# Patient Record
(demographics unavailable — no encounter records)

---

## 2025-02-04 NOTE — DISCUSSION/SUMMARY
[de-identified] : A discussion regarding available pain management treatment options occurred with the patient. These included interventional, rehabilitative, pharmacological, and alternative modalities. We will proceed with the following:   Interventional treatment options: - None indicated at this time.   Imagin. MRI lumbar spine w/o contrast to evaluate for anatomic changes of the lumbar discs, nerves, and surrounding tissue that will help provide information to accurately diagnose the patient's cause of pain and therefore treat said pain generator in the most effective way possible - whether that be specific physical therapy recommendations, medications, and/or interventional therapies.   Medications: 1. Ordered Gabapentin 300 mg 2 tablets QHS (30-day supply, 60 tablets) 2. Ordered Cymbalta 30 mg qhs (30-day supply, 30 tablets)  Potential side effects were discussed which included dizziness, sedation, and pedal edema to name a few. If the patient cannot tolerate these side effects should they occur, then they will stop the medication. If the patient experiences sedation, they understand that they should not drive. The usage of the medication was discussed and the patient understands that it is an anti-epileptic medication used for neuropathic pain and that the pain relief from this medication will likely be subtle, and that hopefully in combination with the other treatments we are offering we will be able to get some additive relief.   Complementary treatment options: - lifestyle modifications discussed - avoid bending and bend with knees - avoid heavy lifting   - Follow up in 2 weeks to discuss imaging.   I Cassia Spencer attest that this documentation has been prepared under the direction and in the presence of provider Dr. Tom Heart.  The documentation recorded by the scribe in my presence, accurately reflects the service I personally performed, and the decisions made by me with my edits as appropriate.   Tom Heart, DO

## 2025-02-04 NOTE — PHYSICAL EXAM
[de-identified] : C spine  No rashes, erythema, edema noted TTP b/l cervical paraspinal and trapezius muscles Positive spurlings bilaterally RUE: 5/5 strength LUE: 5/5 strength   L spine   No rashes, erythema, edema noted TTP b/l lumbar paraspinals and sciatic notch Positive straight leg raise bilaterally Lumbar facet loading positive LLE: 5/5 strength RLE: 5/5 strength Sensation intact b/l LE

## 2025-02-04 NOTE — PHYSICAL EXAM
[de-identified] : C spine  No rashes, erythema, edema noted TTP b/l cervical paraspinal and trapezius muscles Positive spurlings bilaterally RUE: 5/5 strength LUE: 5/5 strength   L spine   No rashes, erythema, edema noted TTP b/l lumbar paraspinals and sciatic notch Positive straight leg raise bilaterally Lumbar facet loading positive LLE: 5/5 strength RLE: 5/5 strength Sensation intact b/l LE

## 2025-02-04 NOTE — DISCUSSION/SUMMARY
[de-identified] : A discussion regarding available pain management treatment options occurred with the patient. These included interventional, rehabilitative, pharmacological, and alternative modalities. We will proceed with the following:   Interventional treatment options: - None indicated at this time.   Imagin. MRI lumbar spine w/o contrast to evaluate for anatomic changes of the lumbar discs, nerves, and surrounding tissue that will help provide information to accurately diagnose the patient's cause of pain and therefore treat said pain generator in the most effective way possible - whether that be specific physical therapy recommendations, medications, and/or interventional therapies.   Medications: 1. Ordered Gabapentin 300 mg 2 tablets QHS (30-day supply, 60 tablets) 2. Ordered Cymbalta 30 mg qhs (30-day supply, 30 tablets)  Potential side effects were discussed which included dizziness, sedation, and pedal edema to name a few. If the patient cannot tolerate these side effects should they occur, then they will stop the medication. If the patient experiences sedation, they understand that they should not drive. The usage of the medication was discussed and the patient understands that it is an anti-epileptic medication used for neuropathic pain and that the pain relief from this medication will likely be subtle, and that hopefully in combination with the other treatments we are offering we will be able to get some additive relief.   Complementary treatment options: - lifestyle modifications discussed - avoid bending and bend with knees - avoid heavy lifting   - Follow up in 2 weeks to discuss imaging.   I Cassia Spencer attest that this documentation has been prepared under the direction and in the presence of provider Dr. Tom Heart.  The documentation recorded by the scribe in my presence, accurately reflects the service I personally performed, and the decisions made by me with my edits as appropriate.   Tom Heart, DO

## 2025-03-05 NOTE — DISCUSSION/SUMMARY
[de-identified] : A discussion regarding available pain management treatment options occurred with the patient. These included interventional, rehabilitative, pharmacological, and alternative modalities. We will proceed with the following:   Interventional treatment options: 1. Proceed with a Left L3-4, L4-5 transforaminal epidural steroid injection under fluoroscopic guidance.  Treatment options were discussed. The patient has been having persistent, severe low back pain and lumbar radicular pain that has minimally improved with conservative therapy thus far (including physical therapy, home stretching and anti-inflammatory medications. The patient was given the option of proceeding with a lumbar epidural steroid injection to try and get the patient some pain relief. The risks and benefits were discussed, which included the associated problems with steroids, bleeding, nerve injury, lack of improvement with pain, and 0.5% chance of a post dural puncture headache.   Complementary treatment options: - lifestyle modifications discussed - avoid bending and bend with knees - avoid heavy lifting   - Follow up 2 weeks post injection.   I Cassia Spencer attest that this documentation has been prepared under the direction and in the presence of provider Dr. Tom Heart.  The documentation recorded by the scribe in my presence, accurately reflects the service I personally performed, and the decisions made by me with my edits as appropriate.   Tom Heart, DO

## 2025-03-05 NOTE — PHYSICAL EXAM
[de-identified] : C spine  No rashes, erythema, edema noted TTP b/l cervical paraspinal and trapezius muscles Positive spurlings bilaterally RUE: 5/5 strength LUE: 5/5 strength   L spine   No rashes, erythema, edema noted TTP b/l lumbar paraspinals and sciatic notch Positive straight leg raise bilaterally Lumbar facet loading positive LLE: 5/5 strength RLE: 5/5 strength Sensation intact b/l LE

## 2025-03-05 NOTE — DATA REVIEWED
[FreeTextEntry1] : MRI of the lumbar spine taken on 2- @ [facility] showed mild S scoliosis with rotary component and preservation of sagittal lordosis. Multilevel disc disease L1-2 through L5-S1. L5-S1 mild disc bulge and facet arthrosis. L4-5 degenerated bulging intervertebral disc. Interval minimal anterolisthesis of L4. Mild facet arthrosis. L3-4 shallow left foraminal and extraforaminal small disc herniation. No change. L2-3 asymmetric right sided disc bulge. No change. L1-2 severe chronic disc degeneration. Mildly bulging disc. No change.

## 2025-03-05 NOTE — DISCUSSION/SUMMARY
[de-identified] : A discussion regarding available pain management treatment options occurred with the patient. These included interventional, rehabilitative, pharmacological, and alternative modalities. We will proceed with the following:   Interventional treatment options: 1. Proceed with a Left L3-4, L4-5 transforaminal epidural steroid injection under fluoroscopic guidance.  Treatment options were discussed. The patient has been having persistent, severe low back pain and lumbar radicular pain that has minimally improved with conservative therapy thus far (including physical therapy, home stretching and anti-inflammatory medications. The patient was given the option of proceeding with a lumbar epidural steroid injection to try and get the patient some pain relief. The risks and benefits were discussed, which included the associated problems with steroids, bleeding, nerve injury, lack of improvement with pain, and 0.5% chance of a post dural puncture headache.   Complementary treatment options: - lifestyle modifications discussed - avoid bending and bend with knees - avoid heavy lifting   - Follow up 2 weeks post injection.   I Cassia Spencer attest that this documentation has been prepared under the direction and in the presence of provider Dr. Tom Heart.  The documentation recorded by the scribe in my presence, accurately reflects the service I personally performed, and the decisions made by me with my edits as appropriate.   Tom Heart, DO

## 2025-03-05 NOTE — HISTORY OF PRESENT ILLNESS
[FreeTextEntry1] : HISTORY OF PRESENT ILLNESS: This is a 57-year-old female with chronic pain involving her neck and low back with radicular features. She has undergone a bevy of interventional procedures such as cervical epidurals versus lumbar epidurals in the past, which gave her excellent sustained benefit.  PRESENTING TODAY: In revisit encounter in regard to her continued neck and lower back pain. She is most recently s/p a series of three left L2-L3, L3-L4 SNRIs w/MAC with her most recent injection on 6/29/23 with excellent relief. She states that her pain has been tolerable. She continues to respond well to periodic epidural steroid injections. She has been undergoing physical therapy treatments for her lumbar spine and aggravated her shoulder pain on one of the exercises. I recommended she visit orthopedics for evaluation. She has also been utilizing Gabapentin 300 mg QID and Cymbalta which provides her with at least 50% relief on most days. She states that her pain continues to be worse at night before bedtime and does not allow for adequate sleep.   TODAY, 5-: Revisit encounter. She is a former Dr. Ann patient presenting to [facility]. She has chronic neck and lower back pain.   She states her pain is currently manageable. She has recently been going through a lot with her daughter and do to all of the stress from dealing with her daughter's health conditions, she was recently placed on anti-anxiety medications. For her pain, she medically manages her pain with Gabapentin 300 mg 1 tablet qhs and Cymbalta 30 mg 2 tablets qhs.   2-4-2025: Revisit encounter.   She is presenting today with intermittent pain in the neck.   With regards to her lower back pain, she is presenting with pain flaring up. She has been experiencing worsening pain in the lower lumbar spine with referred pain into the left worse than right lower extremity. She describes the pain as sharp, shooting, throbbing, aching. Her pain is made worse with standing or walking for prolonged periods of time. Her pain is present daily and rated at a 7/10 on the pain scale.   For her pain, she medically manages her pain with Gabapentin 300 mg 1 tablet qhs and Cymbalta 30 mg 2 tablets qhs.   3-4-2025: Revisit encounter.   Since her last office visit, she underwent an MRI of the lumbar spine. She states her intermittent in nature and the most bothersome at night when lying down in bed. She cannot fall asleep unless she has a heating pad on to help relieve the pain. Her pain is mainly on the left side of the lumbar spine with radicular feature into the left lower extremity. Her pain is rated anywhere from a 4 to a 7/10 on the pain scale.

## 2025-03-05 NOTE — HISTORY OF PRESENT ILLNESS
[FreeTextEntry1] : HISTORY OF PRESENT ILLNESS: This is a 57-year-old female with chronic pain involving her neck and low back with radicular features. She has undergone a bevy of interventional procedures such as cervical epidurals versus lumbar epidurals in the past, which gave her excellent sustained benefit.  PRESENTING TODAY: In revisit encounter in regard to her continued neck and lower back pain. She is most recently s/p a series of three left L2-L3, L3-L4 SNRIs w/MAC with her most recent injection on 6/29/23 with excellent relief. She states that her pain has been tolerable. She continues to respond well to periodic epidural steroid injections. She has been undergoing physical therapy treatments for her lumbar spine and aggravated her shoulder pain on one of the exercises. I recommended she visit orthopedics for evaluation. She has also been utilizing Gabapentin 300 mg QID and Cymbalta which provides her with at least 50% relief on most days. She states that her pain continues to be worse at night before bedtime and does not allow for adequate sleep.   TODAY, 5-: Revisit encounter. She is a former Dr. Ann patient presenting to [facility]. She has chronic neck and lower back pain.   She states her pain is currently manageable. She has recently been going through a lot with her daughter and do to all of the stress from dealing with her daughter's health conditions, she was recently placed on anti-anxiety medications. For her pain, she medically manages her pain with Gabapentin 300 mg 1 tablet qhs and Cymbalta 30 mg 2 tablets qhs.   2-4-2025: Revisit encounter.   She is presenting today with intermittent pain in the neck.   With regards to her lower back pain, she is presenting with pain flaring up. She has been experiencing worsening pain in the lower lumbar spine with referred pain into the left worse than right lower extremity. She describes the pain as sharp, shooting, throbbing, aching. Her pain is made worse with standing or walking for prolonged periods of time. Her pain is present daily and rated at a 7/10 on the pain scale.   For her pain, she medically manages her pain with Gabapentin 300 mg 1 tablet qhs and Cymbalta 30 mg 2 tablets qhs.   3-4-2025: Revisit encounter.   Since her last office visit, she underwent an MRI of the lumbar spine. She states her intermittent in nature and the most bothersome at night when lying down in bed. She cannot fall asleep unless she has a heating pad on to help relieve the pain. Her pain is mainly on the left side of the lumbar spine with radicular feature into the left lower extremity. Her pain is rated anywhere from a 4 to a 7/10 on the pain scale.

## 2025-03-05 NOTE — PHYSICAL EXAM
[de-identified] : C spine  No rashes, erythema, edema noted TTP b/l cervical paraspinal and trapezius muscles Positive spurlings bilaterally RUE: 5/5 strength LUE: 5/5 strength   L spine   No rashes, erythema, edema noted TTP b/l lumbar paraspinals and sciatic notch Positive straight leg raise bilaterally Lumbar facet loading positive LLE: 5/5 strength RLE: 5/5 strength Sensation intact b/l LE

## 2025-03-31 NOTE — PROCEDURE
[FreeTextEntry3] : Date of Service: 03/31/2025  Account: [id number] Patient: KYLE OCHOA YOB: 1964 Age: 60 year Surgeon: Tom Heart DO Assistant: None. Pre-Operative Diagnosis:   Lumbar Radiculopathy Post Operative Diagnosis:  Lumbar Radiculopathy Procedure:  Left L3-L4, L4-L5 transforaminal epidural steroid injection under fluoroscopic guidance. Anesthesia: MAC   This procedure was carried out using fluoroscopic guidance.  The risks and benefits of the procedure were discussed extensively with the patient.  The consent of the patient was obtained and the following procedure was performed. The patient was placed in the prone position on the fluoroscopy table and the area was prepped and draped in a sterile fashion.  A timeout was performed with all essential staff present and the site and side were verified.   The left L3-L4 neural foramen was then identified on left oblique "zeynep dog" anatomical view at the 6 o' clock position using fluoroscopic guidance, and the area was marked.  A 25-gauge 3.5-inch spinal needle was directed toward the inferior (6 o'clock) position of the pedicle, which formed the roof of the identified foramen.  Once in the epidural space, after negative aspiration for heme and CSF, 1cc of Omnipaque contrast was injected to confirm epidural location and assess filling defects and rule out intravascular needle placement.   Lumbar epidurogram showed no intravascular or intrathecal flow pattern.  No blood or CSF was aspirated.  Omnipaque spread medially in epidural space and outlined the exiting nerve root.   After this, 2.5cc of a 5cc mixture of preservative free normal saline 10mg dexamethasone was injected in the epidural space.   The left L4-L5 neural foramen was then identified on left oblique "zeynep dog" anatomical view at the 6 o' clock position using fluoroscopic guidance, and the area was marked. A 25-gauge 3.5-inch spinal needle was directed toward the inferior (6 o'clock) position of the pedicle, which formed the roof of the identified foramen.  Once in the epidural space, after negative aspiration for heme and CSF, 1cc of Omnipaque contrast was injected to confirm epidural location and assess filling defects and rule out intravascular needle placement.   Lumbar epidurogram showed no intravascular or intrathecal flow pattern.  No blood or CSF was aspirated.  Omnipaque spread medially in epidural space and outlined the exiting nerve root.   After this, the remainder of the injectate listed above was injected in the epidural space.   The needle was subsequently removed.  Vital signs remained normal.  Pulse oximeter was used throughout the procedure and the patient's pulse and oxygen saturation remained within normal limits.  The patient tolerated the procedure well.  There were no complications.  The patient was instructed to apply ice over the injection sites for twenty minutes every two hours for the next 24 to 48 hours.     Disposition:        1. The patient was advised to F/U in 1-2 weeks to assess the response to the injection.      2. The patient was also instructed to contact me immediately if there were any concerns related to the procedure performed.

## 2025-03-31 NOTE — PROCEDURE
[FreeTextEntry3] : Date of Service: 03/31/2025  Account: [id number] Patient: KYLE OCHOA YOB: 1964 Age: 60 year Surgeon: Tom Heart DO Assistant: None. Pre-Operative Diagnosis:   Lumbar Radiculopathy Post Operative Diagnosis:  Lumbar Radiculopathy Procedure:  Left L3-L4, L4-L5 transforaminal epidural steroid injection under fluoroscopic guidance. Anesthesia: MAC   This procedure was carried out using fluoroscopic guidance.  The risks and benefits of the procedure were discussed extensively with the patient.  The consent of the patient was obtained and the following procedure was performed. The patient was placed in the prone position on the fluoroscopy table and the area was prepped and draped in a sterile fashion.  A timeout was performed with all essential staff present and the site and side were verified.   The left L3-L4 neural foramen was then identified on left oblique "zeynep dog" anatomical view at the 6 o' clock position using fluoroscopic guidance, and the area was marked.  A 25-gauge 3.5-inch spinal needle was directed toward the inferior (6 o'clock) position of the pedicle, which formed the roof of the identified foramen.  Once in the epidural space, after negative aspiration for heme and CSF, 1cc of Omnipaque contrast was injected to confirm epidural location and assess filling defects and rule out intravascular needle placement.   Lumbar epidurogram showed no intravascular or intrathecal flow pattern.  No blood or CSF was aspirated.  Omnipaque spread medially in epidural space and outlined the exiting nerve root.   After this, 2.5cc of a 5cc mixture of preservative free normal saline 10mg dexamethasone was injected in the epidural space.   The left L4-L5 neural foramen was then identified on left oblique "zeynep dog" anatomical view at the 6 o' clock position using fluoroscopic guidance, and the area was marked. A 25-gauge 3.5-inch spinal needle was directed toward the inferior (6 o'clock) position of the pedicle, which formed the roof of the identified foramen.  Once in the epidural space, after negative aspiration for heme and CSF, 1cc of Omnipaque contrast was injected to confirm epidural location and assess filling defects and rule out intravascular needle placement.   Lumbar epidurogram showed no intravascular or intrathecal flow pattern.  No blood or CSF was aspirated.  Omnipaque spread medially in epidural space and outlined the exiting nerve root.   After this, the remainder of the injectate listed above was injected in the epidural space.   The needle was subsequently removed.  Vital signs remained normal.  Pulse oximeter was used throughout the procedure and the patient's pulse and oxygen saturation remained within normal limits.  The patient tolerated the procedure well.  There were no complications.  The patient was instructed to apply ice over the injection sites for twenty minutes every two hours for the next 24 to 48 hours.     Disposition:        1. The patient was advised to F/U in 1-2 weeks to assess the response to the injection.      2. The patient was also instructed to contact me immediately if there were any concerns related to the procedure performed.

## 2025-04-23 NOTE — PHYSICAL EXAM
[de-identified] : C spine  No rashes, erythema, edema noted TTP b/l cervical paraspinal and trapezius muscles Positive spurlings bilaterally RUE: 5/5 strength LUE: 5/5 strength   L spine   No rashes, erythema, edema noted TTP b/l lumbar paraspinals and sciatic notch Positive straight leg raise bilaterally L>R Lumbar facet loading positive LLE: 5/5 strength RLE: 5/5 strength Sensation intact b/l LE

## 2025-04-23 NOTE — PHYSICAL EXAM
[de-identified] : C spine  No rashes, erythema, edema noted TTP b/l cervical paraspinal and trapezius muscles Positive spurlings bilaterally RUE: 5/5 strength LUE: 5/5 strength   L spine   No rashes, erythema, edema noted TTP b/l lumbar paraspinals and sciatic notch Positive straight leg raise bilaterally L>R Lumbar facet loading positive LLE: 5/5 strength RLE: 5/5 strength Sensation intact b/l LE

## 2025-04-23 NOTE — HISTORY OF PRESENT ILLNESS
[FreeTextEntry1] : HISTORY OF PRESENT ILLNESS: This is a 57-year-old female with chronic pain involving her neck and low back with radicular features. She has undergone a bevy of interventional procedures such as cervical epidurals versus lumbar epidurals in the past, which gave her excellent sustained benefit.  PRESENTING TODAY: In revisit encounter in regard to her continued neck and lower back pain. She is most recently s/p a series of three left L2-L3, L3-L4 SNRIs w/MAC with her most recent injection on 6/29/23 with excellent relief. She states that her pain has been tolerable. She continues to respond well to periodic epidural steroid injections. She has been undergoing physical therapy treatments for her lumbar spine and aggravated her shoulder pain on one of the exercises. I recommended she visit orthopedics for evaluation. She has also been utilizing Gabapentin 300 mg QID and Cymbalta which provides her with at least 50% relief on most days. She states that her pain continues to be worse at night before bedtime and does not allow for adequate sleep.   TODAY, 5-: Revisit encounter. She is a former Dr. Ann patient presenting to [facility]. She has chronic neck and lower back pain.   She states her pain is currently manageable. She has recently been going through a lot with her daughter and do to all of the stress from dealing with her daughter's health conditions, she was recently placed on anti-anxiety medications. For her pain, she medically manages her pain with Gabapentin 300 mg 1 tablet qhs and Cymbalta 30 mg 2 tablets qhs.   2-4-2025: Revisit encounter.   She is presenting today with intermittent pain in the neck.   With regards to her lower back pain, she is presenting with pain flaring up. She has been experiencing worsening pain in the lower lumbar spine with referred pain into the left worse than right lower extremity. She describes the pain as sharp, shooting, throbbing, aching. Her pain is made worse with standing or walking for prolonged periods of time. Her pain is present daily and rated at a 7/10 on the pain scale.   For her pain, she medically manages her pain with Gabapentin 300 mg 1 tablet qhs and Cymbalta 30 mg 2 tablets qhs.   3-4-2025: Revisit encounter.   Since her last office visit, she underwent an MRI of the lumbar spine. She states her intermittent in nature and the most bothersome at night when lying down in bed. She cannot fall asleep unless she has a heating pad on to help relieve the pain. Her pain is mainly on the left side of the lumbar spine with radicular feature into the left lower extremity. Her pain is rated anywhere from a 4 to a 7/10 on the pain scale.   4-: Revisit encounter.   Since her last office visit, she underwent a Left L3-4, L4-5 transforaminal epidural steroid injection on 3-. She afforded about 80% relief for 2 weeks and then her pain slowly started to return. She does note some ongoing relief however there is still pain present. She continues with radicular feature into the left worse than right lower extremity. She describes the pain as sharp, shooting. Her pain is the most bothersome when lying down in bed at night. She rates the pain at a 5/10 on the pain scale.

## 2025-04-23 NOTE — DISCUSSION/SUMMARY
[de-identified] : A discussion regarding available pain management treatment options occurred with the patient. These included interventional, rehabilitative, pharmacological, and alternative modalities. We will proceed with the following:   Interventional treatment options: 1. Proceed with a Left L2-3, L3-4 transforaminal epidural steroid injection under fluoroscopic guidance.  Treatment options were discussed. The patient has been having persistent, severe low back pain and lumbar radicular pain that has minimally improved with conservative therapy thus far (including physical therapy, home stretching and anti-inflammatory medications. The patient was given the option of proceeding with a lumbar epidural steroid injection to try and get the patient some pain relief. The risks and benefits were discussed, which included the associated problems with steroids, bleeding, nerve injury, lack of improvement with pain, and 0.5% chance of a post dural puncture headache.   Complementary treatment options: - lifestyle modifications discussed - avoid bending and bend with knees - avoid heavy lifting   - Follow up 2 weeks post injection.   I Cassia Spencer attest that this documentation has been prepared under the direction and in the presence of provider Dr. Tom Heart.  The documentation recorded by the scribe in my presence, accurately reflects the service I personally performed, and the decisions made by me with my edits as appropriate.   Tom Heart, DO

## 2025-04-23 NOTE — DISCUSSION/SUMMARY
[de-identified] : A discussion regarding available pain management treatment options occurred with the patient. These included interventional, rehabilitative, pharmacological, and alternative modalities. We will proceed with the following:   Interventional treatment options: 1. Proceed with a Left L2-3, L3-4 transforaminal epidural steroid injection under fluoroscopic guidance.  Treatment options were discussed. The patient has been having persistent, severe low back pain and lumbar radicular pain that has minimally improved with conservative therapy thus far (including physical therapy, home stretching and anti-inflammatory medications. The patient was given the option of proceeding with a lumbar epidural steroid injection to try and get the patient some pain relief. The risks and benefits were discussed, which included the associated problems with steroids, bleeding, nerve injury, lack of improvement with pain, and 0.5% chance of a post dural puncture headache.   Complementary treatment options: - lifestyle modifications discussed - avoid bending and bend with knees - avoid heavy lifting   - Follow up 2 weeks post injection.   I Cassia Spencer attest that this documentation has been prepared under the direction and in the presence of provider Dr. Tom Heart.  The documentation recorded by the scribe in my presence, accurately reflects the service I personally performed, and the decisions made by me with my edits as appropriate.   Tom Heart, DO

## 2025-04-23 NOTE — HISTORY OF PRESENT ILLNESS
[FreeTextEntry1] : HISTORY OF PRESENT ILLNESS: This is a 57-year-old female with chronic pain involving her neck and low back with radicular features. She has undergone a bevy of interventional procedures such as cervical epidurals versus lumbar epidurals in the past, which gave her excellent sustained benefit.  PRESENTING TODAY: In revisit encounter in regard to her continued neck and lower back pain. She is most recently s/p a series of three left L2-L3, L3-L4 SNRIs w/MAC with her most recent injection on 6/29/23 with excellent relief. She states that her pain has been tolerable. She continues to respond well to periodic epidural steroid injections. She has been undergoing physical therapy treatments for her lumbar spine and aggravated her shoulder pain on one of the exercises. I recommended she visit orthopedics for evaluation. She has also been utilizing Gabapentin 300 mg QID and Cymbalta which provides her with at least 50% relief on most days. She states that her pain continues to be worse at night before bedtime and does not allow for adequate sleep.   TODAY, 5-: Revisit encounter. She is a former Dr. Ann patient presenting to [facility]. She has chronic neck and lower back pain.   She states her pain is currently manageable. She has recently been going through a lot with her daughter and do to all of the stress from dealing with her daughter's health conditions, she was recently placed on anti-anxiety medications. For her pain, she medically manages her pain with Gabapentin 300 mg 1 tablet qhs and Cymbalta 30 mg 2 tablets qhs.   2-4-2025: Revisit encounter.   She is presenting today with intermittent pain in the neck.   With regards to her lower back pain, she is presenting with pain flaring up. She has been experiencing worsening pain in the lower lumbar spine with referred pain into the left worse than right lower extremity. She describes the pain as sharp, shooting, throbbing, aching. Her pain is made worse with standing or walking for prolonged periods of time. Her pain is present daily and rated at a 7/10 on the pain scale.   For her pain, she medically manages her pain with Gabapentin 300 mg 1 tablet qhs and Cymbalta 30 mg 2 tablets qhs.   3-4-2025: Revisit encounter.   Since her last office visit, she underwent an MRI of the lumbar spine. She states her intermittent in nature and the most bothersome at night when lying down in bed. She cannot fall asleep unless she has a heating pad on to help relieve the pain. Her pain is mainly on the left side of the lumbar spine with radicular feature into the left lower extremity. Her pain is rated anywhere from a 4 to a 7/10 on the pain scale.   4-: Revisit encounter.   Since her last office visit, she underwent a Left L3-4, L4-5 transforaminal epidural steroid injection on 3-. She afforded about 80% relief for 2 weeks and then her pain slowly started to return. She does note some ongoing relief however there is still pain present. She continues with radicular feature into the left worse than right lower extremity. She describes the pain as sharp, shooting. Her pain is the most bothersome when lying down in bed at night. She rates the pain at a 5/10 on the pain scale.

## 2025-05-04 NOTE — PROCEDURE
[FreeTextEntry3] : Date of Service: 05/01/2025  Account: [id number] Patient: KYLE OCHOA YOB: 1964 Age: 61 year Surgeon: Tom Heart DO Assistant: None. Pre-Operative Diagnosis:   Lumbar Radiculopathy Post Operative Diagnosis:  Lumbar Radiculopathy Procedure:  Left L2-L3, L3-L4 transforaminal epidural steroid injection under fluoroscopic guidance. Anesthesia: none   This procedure was carried out using fluoroscopic guidance.  The risks and benefits of the procedure were discussed extensively with the patient.  The consent of the patient was obtained and the following procedure was performed. The patient was placed in the prone position on the fluoroscopy table and the area was prepped and draped in a sterile fashion.  A timeout was performed with all essential staff present and the site and side were verified.   The left L2-l3 neural foramen was then identified on left oblique "zeynep dog" anatomical view at the 6 o' clock position using fluoroscopic guidance, and the area was marked.  A 25-gauge 3.5-inch spinal needle was directed toward the inferior (6 o'clock) position of the pedicle, which formed the roof of the identified foramen.  Once in the epidural space, after negative aspiration for heme and CSF, 1cc of Omnipaque contrast was injected to confirm epidural location and assess filling defects and rule out intravascular needle placement.   Lumbar epidurogram showed no intravascular or intrathecal flow pattern.  No blood or CSF was aspirated.  Omnipaque spread medially in epidural space and outlined the exiting nerve root.   After this, 2.5cc of a 5cc mixture of preservative free normal saline 10mg dexamethasone was injected in the epidural space.   The left L3-L4 neural foramen was then identified on left oblique "zeynep dog" anatomical view at the 6 o' clock position using fluoroscopic guidance, and the area was marked. A 25-gauge 3.5-inch spinal needle was directed toward the inferior (6 o'clock) position of the pedicle, which formed the roof of the identified foramen.  Once in the epidural space, after negative aspiration for heme and CSF, 1cc of Omnipaque contrast was injected to confirm epidural location and assess filling defects and rule out intravascular needle placement.   Lumbar epidurogram showed no intravascular or intrathecal flow pattern.  No blood or CSF was aspirated.  Omnipaque spread medially in epidural space and outlined the exiting nerve root.   After this, the remainder of the injectate listed above was injected in the epidural space.   The needle was subsequently removed.  Vital signs remained normal.  Pulse oximeter was used throughout the procedure and the patient's pulse and oxygen saturation remained within normal limits.  The patient tolerated the procedure well.  There were no complications.  The patient was instructed to apply ice over the injection sites for twenty minutes every two hours for the next 24 to 48 hours.     Disposition:        1. The patient was advised to F/U in 1-2 weeks to assess the response to the injection.      2. The patient was also instructed to contact me immediately if there were any concerns related to the procedure performed.

## 2025-05-14 NOTE — PHYSICAL EXAM
[de-identified] : C spine  No rashes, erythema, edema noted TTP b/l cervical paraspinal and trapezius muscles Positive spurlings bilaterally RUE: 5/5 strength LUE: 5/5 strength    L spine   No rashes, erythema, edema noted TTP b/l lumbar paraspinals and sciatic notch Positive straight leg raise bilaterally L>R Lumbar facet loading positive LLE: 5/5 strength RLE: 5/5 strength Sensation intact b/l LE

## 2025-05-14 NOTE — DATA REVIEWED
Patient: Sarah Santos  YOB: 1973  Date of Encounter: 2023      Chief Complaint:   Chief Complaint   Patient presents with   • Right Hand - Post-op     Procedure(s):2023  CARPAL TUNNEL RELEASE RIGHT     Surgeon(s):  Chinedu Muniz MD            HPI:  Sarah Santos, 49 y.o. female returns in postoperative follow-up carpal tunnel release right.  She is 13 days postop and doing well.  Her hand pain and numbness have resolved.      Medical History:  Patient Active Problem List   Diagnosis   • Right hand pain   • Arthritis of carpometacarpal (CMC) joint of right thumb     Past Medical History:   Diagnosis Date   • Arthritis    • Back pain    • Carpal tunnel syndrome    • Closed fracture of condyle of right tibia    • Depression    • Elevated cholesterol    • GERD (gastroesophageal reflux disease)    • History of staph infection     after back surgery   • Hx of migraines    • Hypertension          Surgical History:  Past Surgical History:   Procedure Laterality Date   • ABDOMINAL SURGERY     • BACK SURGERY      lumbar x 2   • CARPAL TUNNEL RELEASE Right 2023    Procedure: CARPAL TUNNEL RELEASE RIGHT;  Surgeon: Chinedu Muniz MD;  Location: [facility];  Service: Orthopedics;  Laterality: Right;   •  SECTION      times 3   • COLONOSCOPY     • CYST REMOVAL Right     wrist   • HYSTERECTOMY     • KNEE ARTHROSCOPY Right        Examination:  Examination hand reveals longitudinal incision palmar aspect without surrounding erythema.  Sensation is intact.        Assessment & Plan:  49 y.o. female presents follow-up carpal tunnel release right hand 13 days postop doing well today her sutures were removed she will resume regular activity.  She does have carpal tunnel syndrome on the left it is tolerable she will return as needed.       Diagnosis Plan   1. Postop check                Cc:  Gracy Chong APRN              This document has been electronically signed by Chinedu  [FreeTextEntry1] : MRI of the lumbar spine taken on 2- @ [facility] showed mild S scoliosis with rotary component and preservation of sagittal lordosis. Multilevel disc disease L1-2 through L5-S1. L5-S1 mild disc bulge and facet arthrosis. L4-5 degenerated bulging intervertebral disc. Interval minimal anterolisthesis of L4. Mild facet arthrosis. L3-4 shallow left foraminal and extraforaminal small disc herniation. No change. L2-3 asymmetric right sided disc bulge. No change. L1-2 severe chronic disc degeneration. Mildly bulging disc. No change.  RUSH Muniz MD   April 20, 2023 10:18 EDT

## 2025-05-14 NOTE — DISCUSSION/SUMMARY
[de-identified] : A discussion regarding available pain management treatment options occurred with the patient. These included interventional, rehabilitative, pharmacological, and alternative modalities. We will proceed with the following:   Interventional treatment options: 1. Proceed with a Bilateral L3, L4, L5 medial branch facet block under fluoroscopic guidance. (VALIUM TO BE USED) Treatment options were discussed with the patient. The patient has been having persistent axial low back pain that has minimally improved with conservative therapy.    The patient was given the option to proceed with a lumbar medial branch block, which would be potentially both diagnostic and therapeutic. If the patient has a positive response to the local anesthetic, With two positive responses we can proceed with a radiofrequency ablation of the lumbar medial branch nerves for potential long term pain relief. If the patient does not get relief we can consider other options.    The risks and benefits were discussed which included bleeding, infection, nerve injury, no pain relief or worse, increased pain, intrathecal injection, and the side effects of steroids.  All questions were answered to the patient's satisfaction.  Medications: 1. Ordered DULoxetine 30 mg 2 tablets qhs (30-day supply) 2. Ordered Pregabalin 75 mg q12h prn (30-day supply) 3. Ordered Diazepam 10 mg 1 tablet to be taken 30 minutes prior to ADAM.   We are going to start Pregabalin. Potential side effects were discussed which included dizziness, sedation, and pedal edema to name a few. If the patient cannot tolerate these side effects should they occur, then they will stop the medication. If the patient experiences sedation, they understand that they should not drive. The usage of the medication was discussed and the patient understands that it is an anti-epileptic medication used for neuropathic pain and that the pain relief from this medication will likely be subtle, and that hopefully in combination with the other treatments we are offering we will be able to get some additive relief.   Complementary treatment options: - lifestyle modifications discussed - avoid bending and bend with knees - avoid heavy lifting   - Follow up in 2 weeks post injection.   I Cassia Spencer attest that this documentation has been prepared under the direction and in the presence of provider Dr. Tom Heart.  The documentation recorded by the scribe in my presence, accurately reflects the service I personally performed, and the decisions made by me with my edits as appropriate.   Tom Heart, DO

## 2025-05-14 NOTE — DISCUSSION/SUMMARY
[de-identified] : A discussion regarding available pain management treatment options occurred with the patient. These included interventional, rehabilitative, pharmacological, and alternative modalities. We will proceed with the following:   Interventional treatment options: 1. Proceed with a Bilateral L3, L4, L5 medial branch facet block under fluoroscopic guidance. (VALIUM TO BE USED) Treatment options were discussed with the patient. The patient has been having persistent axial low back pain that has minimally improved with conservative therapy.    The patient was given the option to proceed with a lumbar medial branch block, which would be potentially both diagnostic and therapeutic. If the patient has a positive response to the local anesthetic, With two positive responses we can proceed with a radiofrequency ablation of the lumbar medial branch nerves for potential long term pain relief. If the patient does not get relief we can consider other options.    The risks and benefits were discussed which included bleeding, infection, nerve injury, no pain relief or worse, increased pain, intrathecal injection, and the side effects of steroids.  All questions were answered to the patient's satisfaction.  Medications: 1. Ordered DULoxetine 30 mg 2 tablets qhs (30-day supply) 2. Ordered Pregabalin 75 mg q12h prn (30-day supply) 3. Ordered Diazepam 10 mg 1 tablet to be taken 30 minutes prior to ADAM.   We are going to start Pregabalin. Potential side effects were discussed which included dizziness, sedation, and pedal edema to name a few. If the patient cannot tolerate these side effects should they occur, then they will stop the medication. If the patient experiences sedation, they understand that they should not drive. The usage of the medication was discussed and the patient understands that it is an anti-epileptic medication used for neuropathic pain and that the pain relief from this medication will likely be subtle, and that hopefully in combination with the other treatments we are offering we will be able to get some additive relief.   Complementary treatment options: - lifestyle modifications discussed - avoid bending and bend with knees - avoid heavy lifting   - Follow up in 2 weeks post injection.   I Cassia Spencer attest that this documentation has been prepared under the direction and in the presence of provider Dr. Tom Heart.  The documentation recorded by the scribe in my presence, accurately reflects the service I personally performed, and the decisions made by me with my edits as appropriate.   Tom Heart, DO

## 2025-05-14 NOTE — PHYSICAL EXAM
[de-identified] : C spine  No rashes, erythema, edema noted TTP b/l cervical paraspinal and trapezius muscles Positive spurlings bilaterally RUE: 5/5 strength LUE: 5/5 strength    L spine   No rashes, erythema, edema noted TTP b/l lumbar paraspinals and sciatic notch Positive straight leg raise bilaterally L>R Lumbar facet loading positive LLE: 5/5 strength RLE: 5/5 strength Sensation intact b/l LE

## 2025-05-14 NOTE — HISTORY OF PRESENT ILLNESS
[FreeTextEntry1] : HISTORY OF PRESENT ILLNESS: This is a 57-year-old female with chronic pain involving her neck and low back with radicular features. She has undergone a bevy of interventional procedures such as cervical epidurals versus lumbar epidurals in the past, which gave her excellent sustained benefit.  PRESENTING TODAY: In revisit encounter in regard to her continued neck and lower back pain. She is most recently s/p a series of three left L2-L3, L3-L4 SNRIs w/MAC with her most recent injection on 6/29/23 with excellent relief. She states that her pain has been tolerable. She continues to respond well to periodic epidural steroid injections. She has been undergoing physical therapy treatments for her lumbar spine and aggravated her shoulder pain on one of the exercises. I recommended she visit orthopedics for evaluation. She has also been utilizing Gabapentin 300 mg QID and Cymbalta which provides her with at least 50% relief on most days. She states that her pain continues to be worse at night before bedtime and does not allow for adequate sleep.   TODAY, 5-: Revisit encounter. She is a former Dr. Ann patient presenting to [facility]. She has chronic neck and lower back pain.   She states her pain is currently manageable. She has recently been going through a lot with her daughter and do to all of the stress from dealing with her daughter's health conditions, she was recently placed on anti-anxiety medications. For her pain, she medically manages her pain with Gabapentin 300 mg 1 tablet qhs and Cymbalta 30 mg 2 tablets qhs.   2-4-2025: Revisit encounter.   She is presenting today with intermittent pain in the neck.   With regards to her lower back pain, she is presenting with pain flaring up. She has been experiencing worsening pain in the lower lumbar spine with referred pain into the left worse than right lower extremity. She describes the pain as sharp, shooting, throbbing, aching. Her pain is made worse with standing or walking for prolonged periods of time. Her pain is present daily and rated at a 7/10 on the pain scale.   For her pain, she medically manages her pain with Gabapentin 300 mg 1 tablet qhs and Cymbalta 30 mg 2 tablets qhs.   3-4-2025: Revisit encounter.   Since her last office visit, she underwent an MRI of the lumbar spine. She states her intermittent in nature and the most bothersome at night when lying down in bed. She cannot fall asleep unless she has a heating pad on to help relieve the pain. Her pain is mainly on the left side of the lumbar spine with radicular feature into the left lower extremity. Her pain is rated anywhere from a 4 to a 7/10 on the pain scale.   4-: Revisit encounter.   Since her last office visit, she underwent a Left L3-4, L4-5 transforaminal epidural steroid injection on 3-. She afforded about 80% relief for 2 weeks and then her pain slowly started to return. She does note some ongoing relief however there is still pain present. She continues with radicular feature into the left worse than right lower extremity. She describes the pain as sharp, shooting. Her pain is the most bothersome when lying down in bed at night. She rates the pain at a 5/10 on the pain scale.   5-: Revisit encounter.   Since her last office visit, she underwent a Left L2-3, L3-4 transforaminal epidural steroid injection on 5-1-2025. She affords about 40-50% relief from this procedure. She is now able to tolerate her symptoms a little bit better. Her pain is intermittent in nature. She rates the pain anywhere from a 4 to a 5/10 on the pain scale. She has been managing her pain with Gabapentin 300 mg q12h along with DULoxetine 30 mg 2 tablets qhs.  This medication regimen provides satisfactory relief of at least 50% and allows the patient to perform their ADLs and improve overall function. The patient uses the medication appropriately and does not demonstrate any aberrant behavior.

## 2025-05-23 NOTE — PROCEDURE
[FreeTextEntry3] : Date of Service: 05/22/2025   Account: [id number]  Patient: KYLE OCHOA   YOB: 1964  Age: 61 year  Surgeon:                  Tom Heart DO  Assistant:                None  Pre-Operative Diagnosis:   Lumbar Spondylosis      Post Operative Diagnosis:  Lumbar Spondylosis  Procedure:             Bilateral  (L3, L4, L5 medial branch) L4-5, L5-S1 facet joint block under fluoroscopic guidance.  Anesthesia:            none  This procedure was carried out using fluoroscopic guidance.  The risks and benefits of the procedure were discussed extensively with the patient.  The consent of the patient was obtained and the following procedure was performed. The patient was placed in the prone position on the fluoroscopy table and the area was prepped and draped in a sterile fashion.  A timeout was performed with all essential staff present and the site and side were verified.  The lumbar vertebral bodies were identified and the fluoroscope was obliqued ipsilateral to approximately 30 degrees to reveal the appropriate anatomical view.  The junction of the superior articulate process and transverse process at the right L4, and L5 levels were identified and marked. A spinal needle was then introduced and advanced to the above target points at the junction of the SAP and transverse processes until bone was contacted.  Fluoroscope then focused on the right sacral ala on A/P view, and marked at this point.  A spinal needle was then advanced under fluoroscopic guidance until bone was contacted at the ala.    After negative aspiration for heme and CSF, an 1 cc of a mixture of 0.5% Marcaine and 10mg decadron was injected at each of the injection sites.  The procedure was performed in the exact same fashion on the contralateral left side at the L4, L5 and sacral ala levels.  The needles were subsequently removed.  Vital signs remained normal.  Pulse oximeter was used throughout the procedure and the patient's pulse and oxygen saturation remained within normal limits.  The patient tolerated the procedure well.  There were no complications.  The patient was instructed to apply ice over the injection sites for twenty minutes every two hours for the next 24 to 48 hours.  Disposition:       1. The patient was advised to F/U in 1-2 weeks to assess the response to the injection.       2. They were advised to keep a pain diary to report the results of the diagnostic block at their FUV.      3. The patient was also instructed to contact me immediately if there were any concerns related to the procedure performed.

## 2025-06-11 NOTE — DISCUSSION/SUMMARY
[de-identified] : A discussion regarding available pain management treatment options occurred with the patient. These included interventional, rehabilitative, pharmacological, and alternative modalities. We will proceed with the following:   Interventional treatment options: 1. Proceed with a second confirmatory Bilateral L3, L4, L5 medial branch facet block under fluoroscopic guidance. (VALIUM TO BE USED) Treatment options were discussed with the patient. The patient has been having persistent axial low back pain that has minimally improved with conservative therapy.    The patient was given the option to proceed with a lumbar medial branch block, which would be potentially both diagnostic and therapeutic. If the patient has a positive response to the local anesthetic, With two positive responses we can proceed with a radiofrequency ablation of the lumbar medial branch nerves for potential long term pain relief. If the patient does not get relief we can consider other options.    The risks and benefits were discussed which included bleeding, infection, nerve injury, no pain relief or worse, increased pain, intrathecal injection, and the side effects of steroids.  All questions were answered to the patient's satisfaction.  Medications: 1. Ordered Diazepam 10 mg 1 tablet to be taken 30 minutes prior to MBB 2. Ordered Pregabalin 75 mg q12h prn (30-day supply) - Continue with Tizanidine 4 mg qhs (not due for refill)  We are going to start Pregabalin. Potential side effects were discussed which included dizziness, sedation, and pedal edema to name a few. If the patient cannot tolerate these side effects should they occur, then they will stop the medication. If the patient experiences sedation, they understand that they should not drive. The usage of the medication was discussed and the patient understands that it is an anti-epileptic medication used for neuropathic pain and that the pain relief from this medication will likely be subtle, and that hopefully in combination with the other treatments we are offering we will be able to get some additive relief.   Complementary treatment options: - lifestyle modifications discussed - avoid bending and bend with knees - avoid heavy lifting   - Follow up in 2 weeks post injection.   I Cassia Spencer attest that this documentation has been prepared under the direction and in the presence of provider Dr. Tom Heart.  The documentation recorded by the scribe in my presence, accurately reflects the service I personally performed, and the decisions made by me with my edits as appropriate.   Tom Heart, DO

## 2025-06-11 NOTE — DISCUSSION/SUMMARY
[de-identified] : A discussion regarding available pain management treatment options occurred with the patient. These included interventional, rehabilitative, pharmacological, and alternative modalities. We will proceed with the following:   Interventional treatment options: 1. Proceed with a second confirmatory Bilateral L3, L4, L5 medial branch facet block under fluoroscopic guidance. (VALIUM TO BE USED) Treatment options were discussed with the patient. The patient has been having persistent axial low back pain that has minimally improved with conservative therapy.    The patient was given the option to proceed with a lumbar medial branch block, which would be potentially both diagnostic and therapeutic. If the patient has a positive response to the local anesthetic, With two positive responses we can proceed with a radiofrequency ablation of the lumbar medial branch nerves for potential long term pain relief. If the patient does not get relief we can consider other options.    The risks and benefits were discussed which included bleeding, infection, nerve injury, no pain relief or worse, increased pain, intrathecal injection, and the side effects of steroids.  All questions were answered to the patient's satisfaction.  Medications: 1. Ordered Diazepam 10 mg 1 tablet to be taken 30 minutes prior to MBB 2. Ordered Pregabalin 75 mg q12h prn (30-day supply) - Continue with Tizanidine 4 mg qhs (not due for refill)  We are going to start Pregabalin. Potential side effects were discussed which included dizziness, sedation, and pedal edema to name a few. If the patient cannot tolerate these side effects should they occur, then they will stop the medication. If the patient experiences sedation, they understand that they should not drive. The usage of the medication was discussed and the patient understands that it is an anti-epileptic medication used for neuropathic pain and that the pain relief from this medication will likely be subtle, and that hopefully in combination with the other treatments we are offering we will be able to get some additive relief.   Complementary treatment options: - lifestyle modifications discussed - avoid bending and bend with knees - avoid heavy lifting   - Follow up in 2 weeks post injection.   I Cassia Spencer attest that this documentation has been prepared under the direction and in the presence of provider Dr. Tom Heart.  The documentation recorded by the scribe in my presence, accurately reflects the service I personally performed, and the decisions made by me with my edits as appropriate.   Tom Heart, DO

## 2025-06-11 NOTE — HISTORY OF PRESENT ILLNESS
[FreeTextEntry1] : HISTORY OF PRESENT ILLNESS: This is a 57-year-old female with chronic pain involving her neck and low back with radicular features. She has undergone a bevy of interventional procedures such as cervical epidurals versus lumbar epidurals in the past, which gave her excellent sustained benefit.  PRESENTING TODAY: In revisit encounter in regard to her continued neck and lower back pain. She is most recently s/p a series of three left L2-L3, L3-L4 SNRIs w/MAC with her most recent injection on 6/29/23 with excellent relief. She states that her pain has been tolerable. She continues to respond well to periodic epidural steroid injections. She has been undergoing physical therapy treatments for her lumbar spine and aggravated her shoulder pain on one of the exercises. I recommended she visit orthopedics for evaluation. She has also been utilizing Gabapentin 300 mg QID and Cymbalta which provides her with at least 50% relief on most days. She states that her pain continues to be worse at night before bedtime and does not allow for adequate sleep.   TODAY, 5-: Revisit encounter. 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 She has chronic neck and lower back pain.   She states her pain is currently manageable. She has recently been going through a lot with her daughter and do to all of the stress from dealing with her daughter's health conditions, she was recently placed on anti-anxiety medications. For her pain, she medically manages her pain with Gabapentin 300 mg 1 tablet qhs and Cymbalta 30 mg 2 tablets qhs.   2-4-2025: Revisit encounter.   She is presenting today with intermittent pain in the neck.   With regards to her lower back pain, she is presenting with pain flaring up. She has been experiencing worsening pain in the lower lumbar spine with referred pain into the left worse than right lower extremity. She describes the pain as sharp, shooting, throbbing, aching. Her pain is made worse with standing or walking for prolonged periods of time. Her pain is present daily and rated at a 7/10 on the pain scale.   For her pain, she medically manages her pain with Gabapentin 300 mg 1 tablet qhs and Cymbalta 30 mg 2 tablets qhs.   3-4-2025: Revisit encounter.   Since her last office visit, she underwent an MRI of the lumbar spine. She states her intermittent in nature and the most bothersome at night when lying down in bed. She cannot fall asleep unless she has a heating pad on to help relieve the pain. Her pain is mainly on the left side of the lumbar spine with radicular feature into the left lower extremity. Her pain is rated anywhere from a 4 to a 7/10 on the pain scale.   4-: Revisit encounter.   Since her last office visit, she underwent a Left L3-4, L4-5 transforaminal epidural steroid injection on 3-. She afforded about 80% relief for 2 weeks and then her pain slowly started to return. She does note some ongoing relief however there is still pain present. She continues with radicular feature into the left worse than right lower extremity. She describes the pain as sharp, shooting. Her pain is the most bothersome when lying down in bed at night. She rates the pain at a 5/10 on the pain scale.   5-: Revisit encounter.   Since her last office visit, she underwent a Left L2-3, L3-4 transforaminal epidural steroid injection on 5-1-2025. She affords about 40-50% relief from this procedure. She is now able to tolerate her symptoms a little bit better. Her pain is intermittent in nature. She rates the pain anywhere from a 4 to a 5/10 on the pain scale. She has been managing her pain with Gabapentin 300 mg q12h along with DULoxetine 30 mg 2 tablets qhs.  This medication regimen provides satisfactory relief of at least 50% and allows the patient to perform their ADLs and improve overall function. The patient uses the medication appropriately and does not demonstrate any aberrant behavior.   6-: Revisit encounter.   Since her last office visit, she underwent a Bilateral L3, L4, L5 medial branch facet block on 5-. She afforded about 90% relief following this procedure. She still continues with ongoing relief today. The pain is minimal and currently tolerable. She rates the pain at a 2/10 on the pain scale. She has been modifying her activities which is also helping relieve her pain. She does also note relief of her radicular pain however during the nighttime she does notice numbness, tingling and burning sensations in the left leg. She has been managing her pain with Tizanidine 4 mg qhs.  This medication regimen provides satisfactory relief of at least 50% and allows the patient to perform their ADLs and improve overall function. The patient uses the medication appropriately and does not demonstrate any aberrant behavior.

## 2025-06-11 NOTE — PHYSICAL EXAM
[de-identified] : C spine  No rashes, erythema, edema noted TTP b/l cervical paraspinal and trapezius muscles Positive spurlings bilaterally RUE: 5/5 strength LUE: 5/5 strength    L spine   No rashes, erythema, edema noted TTP b/l lumbar paraspinals and sciatic notch Positive straight leg raise bilaterally L>R Lumbar facet loading positive LLE: 5/5 strength RLE: 5/5 strength Sensation intact b/l LE

## 2025-06-11 NOTE — PHYSICAL EXAM
[de-identified] : C spine  No rashes, erythema, edema noted TTP b/l cervical paraspinal and trapezius muscles Positive spurlings bilaterally RUE: 5/5 strength LUE: 5/5 strength    L spine   No rashes, erythema, edema noted TTP b/l lumbar paraspinals and sciatic notch Positive straight leg raise bilaterally L>R Lumbar facet loading positive LLE: 5/5 strength RLE: 5/5 strength Sensation intact b/l LE

## 2025-06-11 NOTE — HISTORY OF PRESENT ILLNESS
ideal/105 pounds [FreeTextEntry1] : HISTORY OF PRESENT ILLNESS: This is a 57-year-old female with chronic pain involving her neck and low back with radicular features. She has undergone a bevy of interventional procedures such as cervical epidurals versus lumbar epidurals in the past, which gave her excellent sustained benefit.  PRESENTING TODAY: In revisit encounter in regard to her continued neck and lower back pain. She is most recently s/p a series of three left L2-L3, L3-L4 SNRIs w/MAC with her most recent injection on 6/29/23 with excellent relief. She states that her pain has been tolerable. She continues to respond well to periodic epidural steroid injections. She has been undergoing physical therapy treatments for her lumbar spine and aggravated her shoulder pain on one of the exercises. I recommended she visit orthopedics for evaluation. She has also been utilizing Gabapentin 300 mg QID and Cymbalta which provides her with at least 50% relief on most days. She states that her pain continues to be worse at night before bedtime and does not allow for adequate sleep.   TODAY, 5-: Revisit encounter. 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 She has chronic neck and lower back pain.   She states her pain is currently manageable. She has recently been going through a lot with her daughter and do to all of the stress from dealing with her daughter's health conditions, she was recently placed on anti-anxiety medications. For her pain, she medically manages her pain with Gabapentin 300 mg 1 tablet qhs and Cymbalta 30 mg 2 tablets qhs.   2-4-2025: Revisit encounter.   She is presenting today with intermittent pain in the neck.   With regards to her lower back pain, she is presenting with pain flaring up. She has been experiencing worsening pain in the lower lumbar spine with referred pain into the left worse than right lower extremity. She describes the pain as sharp, shooting, throbbing, aching. Her pain is made worse with standing or walking for prolonged periods of time. Her pain is present daily and rated at a 7/10 on the pain scale.   For her pain, she medically manages her pain with Gabapentin 300 mg 1 tablet qhs and Cymbalta 30 mg 2 tablets qhs.   3-4-2025: Revisit encounter.   Since her last office visit, she underwent an MRI of the lumbar spine. She states her intermittent in nature and the most bothersome at night when lying down in bed. She cannot fall asleep unless she has a heating pad on to help relieve the pain. Her pain is mainly on the left side of the lumbar spine with radicular feature into the left lower extremity. Her pain is rated anywhere from a 4 to a 7/10 on the pain scale.   4-: Revisit encounter.   Since her last office visit, she underwent a Left L3-4, L4-5 transforaminal epidural steroid injection on 3-. She afforded about 80% relief for 2 weeks and then her pain slowly started to return. She does note some ongoing relief however there is still pain present. She continues with radicular feature into the left worse than right lower extremity. She describes the pain as sharp, shooting. Her pain is the most bothersome when lying down in bed at night. She rates the pain at a 5/10 on the pain scale.   5-: Revisit encounter.   Since her last office visit, she underwent a Left L2-3, L3-4 transforaminal epidural steroid injection on 5-1-2025. She affords about 40-50% relief from this procedure. She is now able to tolerate her symptoms a little bit better. Her pain is intermittent in nature. She rates the pain anywhere from a 4 to a 5/10 on the pain scale. She has been managing her pain with Gabapentin 300 mg q12h along with DULoxetine 30 mg 2 tablets qhs.  This medication regimen provides satisfactory relief of at least 50% and allows the patient to perform their ADLs and improve overall function. The patient uses the medication appropriately and does not demonstrate any aberrant behavior.   6-: Revisit encounter.   Since her last office visit, she underwent a Bilateral L3, L4, L5 medial branch facet block on 5-. She afforded about 90% relief following this procedure. She still continues with ongoing relief today. The pain is minimal and currently tolerable. She rates the pain at a 2/10 on the pain scale. She has been modifying her activities which is also helping relieve her pain. She does also note relief of her radicular pain however during the nighttime she does notice numbness, tingling and burning sensations in the left leg. She has been managing her pain with Tizanidine 4 mg qhs.  This medication regimen provides satisfactory relief of at least 50% and allows the patient to perform their ADLs and improve overall function. The patient uses the medication appropriately and does not demonstrate any aberrant behavior.

## 2025-07-15 NOTE — HISTORY OF PRESENT ILLNESS
[FreeTextEntry1] : HISTORY OF PRESENT ILLNESS: This is a 57-year-old female with chronic pain involving her neck and low back with radicular features. She has undergone a bevy of interventional procedures such as cervical epidurals versus lumbar epidurals in the past, which gave her excellent sustained benefit.  PRESENTING TODAY: In revisit encounter in regard to her continued neck and lower back pain. She is most recently s/p a series of three left L2-L3, L3-L4 SNRIs w/MAC with her most recent injection on 6/29/23 with excellent relief. She states that her pain has been tolerable. She continues to respond well to periodic epidural steroid injections. She has been undergoing physical therapy treatments for her lumbar spine and aggravated her shoulder pain on one of the exercises. I recommended she visit orthopedics for evaluation. She has also been utilizing Gabapentin 300 mg QID and Cymbalta which provides her with at least 50% relief on most days. She states that her pain continues to be worse at night before bedtime and does not allow for adequate sleep.   TODAY, 5-: Revisit encounter. 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 She has chronic neck and lower back pain.   She states her pain is currently manageable. She has recently been going through a lot with her daughter and do to all of the stress from dealing with her daughter's health conditions, she was recently placed on anti-anxiety medications. For her pain, she medically manages her pain with Gabapentin 300 mg 1 tablet qhs and Cymbalta 30 mg 2 tablets qhs.   2-4-2025: Revisit encounter.   She is presenting today with intermittent pain in the neck.   With regards to her lower back pain, she is presenting with pain flaring up. She has been experiencing worsening pain in the lower lumbar spine with referred pain into the left worse than right lower extremity. She describes the pain as sharp, shooting, throbbing, aching. Her pain is made worse with standing or walking for prolonged periods of time. Her pain is present daily and rated at a 7/10 on the pain scale.   For her pain, she medically manages her pain with Gabapentin 300 mg 1 tablet qhs and Cymbalta 30 mg 2 tablets qhs.   3-4-2025: Revisit encounter.   Since her last office visit, she underwent an MRI of the lumbar spine. She states her intermittent in nature and the most bothersome at night when lying down in bed. She cannot fall asleep unless she has a heating pad on to help relieve the pain. Her pain is mainly on the left side of the lumbar spine with radicular feature into the left lower extremity. Her pain is rated anywhere from a 4 to a 7/10 on the pain scale.   4-: Revisit encounter.   Since her last office visit, she underwent a Left L3-4, L4-5 transforaminal epidural steroid injection on 3-. She afforded about 80% relief for 2 weeks and then her pain slowly started to return. She does note some ongoing relief however there is still pain present. She continues with radicular feature into the left worse than right lower extremity. She describes the pain as sharp, shooting. Her pain is the most bothersome when lying down in bed at night. She rates the pain at a 5/10 on the pain scale.   5-: Revisit encounter.   Since her last office visit, she underwent a Left L2-3, L3-4 transforaminal epidural steroid injection on 5-1-2025. She affords about 40-50% relief from this procedure. She is now able to tolerate her symptoms a little bit better. Her pain is intermittent in nature. She rates the pain anywhere from a 4 to a 5/10 on the pain scale. She has been managing her pain with Gabapentin 300 mg q12h along with DULoxetine 30 mg 2 tablets qhs.  This medication regimen provides satisfactory relief of at least 50% and allows the patient to perform their ADLs and improve overall function. The patient uses the medication appropriately and does not demonstrate any aberrant behavior.   6-: Revisit encounter.   Since her last office visit, she underwent a Bilateral L3, L4, L5 medial branch facet block on 5-. She afforded about 90% relief following this procedure. She still continues with ongoing relief today. The pain is minimal and currently tolerable. She rates the pain at a 2/10 on the pain scale. She has been modifying her activities which is also helping relieve her pain. She does also note relief of her radicular pain however during the nighttime she does notice numbness, tingling and burning sensations in the left leg. She has been managing her pain with Tizanidine 4 mg qhs.  This medication regimen provides satisfactory relief of at least 50% and allows the patient to perform their ADLs and improve overall function. The patient uses the medication appropriately and does not demonstrate any aberrant behavior.   7-: Revisit encounter.   Since her last office visit, she underwent a Bilateral L3, L4, L5 medial branch facet block on 6-. She affords about 75% sustained relief. She states her pain is currently tolerable. She is able to go about her day to day without any pain flaring up. She rates the pain at a 2-3/10 on the pain scale.

## 2025-07-15 NOTE — PHYSICAL EXAM
[de-identified] : C spine  No rashes, erythema, edema noted TTP b/l cervical paraspinal and trapezius muscles Positive spurlings bilaterally RUE: 5/5 strength LUE: 5/5 strength    L spine   No rashes, erythema, edema noted TTP b/l lumbar paraspinals and sciatic notch Positive straight leg raise bilaterally L>R Lumbar facet loading positive LLE: 5/5 strength RLE: 5/5 strength Sensation intact b/l LE

## 2025-07-15 NOTE — DISCUSSION/SUMMARY
[de-identified] : A discussion regarding available pain management treatment options occurred with the patient. These included interventional, rehabilitative, pharmacological, and alternative modalities. We will proceed with the following:   Interventional treatment options: 1. Proceed with a Right and Left L3, L4, L5 medial branch radiofrequency ablation under fluoroscopic guidance.  Patient presents with axial lumbar pain that has not responded to 3 months of conservative therapy including physical therapy or NSAID therapy.  The pain is interfering with activities of daily living and functionality.  The pain is exacerbated by facet loading.  Positive Kemps maneuver which is defined by pain reproduction with extension and rotation of the lumbar spine to the affected side.  The patient has had greater than 80% to two lumbar medial branch blocks. There is no unexplained neurologic deficit.  There is no history of systemic infection, unstable medical condition, bleeding tendency, or local infection.  This intervention is being performed to treat the patient's pain coming from the lumbar facet joints.  Medications: - continue with Pregabalin 75 mg q12h prn (not due for refill) - Continue with Tizanidine 4 mg qhs (not due for refill)  We are going to start Pregabalin. Potential side effects were discussed which included dizziness, sedation, and pedal edema to name a few. If the patient cannot tolerate these side effects should they occur, then they will stop the medication. If the patient experiences sedation, they understand that they should not drive. The usage of the medication was discussed and the patient understands that it is an anti-epileptic medication used for neuropathic pain and that the pain relief from this medication will likely be subtle, and that hopefully in combination with the other treatments we are offering we will be able to get some additive relief.   We are prescribing a muscle relaxant medication to help the patient's muscle spasm pain. The patient understands to not take this medication before driving or operating any heavy machinery as it can be sedating.  Complementary treatment options: - lifestyle modifications discussed - avoid bending and bend with knees - avoid heavy lifting   - Follow up in 2-3 months.   I Cassia Spencer attest that this documentation has been prepared under the direction and in the presence of provider Dr. Tom Heart.  The documentation recorded by the scribe in my presence, accurately reflects the service I personally performed, and the decisions made by me with my edits as appropriate.   Tom Heart, DO